# Patient Record
Sex: MALE | Race: ASIAN | NOT HISPANIC OR LATINO | URBAN - METROPOLITAN AREA
[De-identification: names, ages, dates, MRNs, and addresses within clinical notes are randomized per-mention and may not be internally consistent; named-entity substitution may affect disease eponyms.]

---

## 2017-01-12 ENCOUNTER — INPATIENT (INPATIENT)
Facility: HOSPITAL | Age: 47
LOS: 0 days | Discharge: HOME | End: 2017-01-13
Attending: INTERNAL MEDICINE | Admitting: INTERNAL MEDICINE
Payer: COMMERCIAL

## 2017-01-12 VITALS
DIASTOLIC BLOOD PRESSURE: 79 MMHG | RESPIRATION RATE: 18 BRPM | TEMPERATURE: 99 F | SYSTOLIC BLOOD PRESSURE: 118 MMHG | HEART RATE: 72 BPM | OXYGEN SATURATION: 99 %

## 2017-01-12 DIAGNOSIS — R07.9 CHEST PAIN, UNSPECIFIED: ICD-10-CM

## 2017-01-12 DIAGNOSIS — Z98.890 OTHER SPECIFIED POSTPROCEDURAL STATES: Chronic | ICD-10-CM

## 2017-01-12 LAB
ALBUMIN SERPL ELPH-MCNC: 4.6 G/DL — SIGNIFICANT CHANGE UP (ref 3.3–5)
ALP SERPL-CCNC: 50 U/L — SIGNIFICANT CHANGE UP (ref 40–120)
ALT FLD-CCNC: 32 U/L — SIGNIFICANT CHANGE UP (ref 4–41)
APTT BLD: 37.4 SEC — SIGNIFICANT CHANGE UP (ref 27.5–37.4)
AST SERPL-CCNC: 26 U/L — SIGNIFICANT CHANGE UP (ref 4–40)
BASOPHILS # BLD AUTO: 0.01 K/UL — SIGNIFICANT CHANGE UP (ref 0–0.2)
BASOPHILS NFR BLD AUTO: 0.1 % — SIGNIFICANT CHANGE UP (ref 0–2)
BILIRUB SERPL-MCNC: 0.4 MG/DL — SIGNIFICANT CHANGE UP (ref 0.2–1.2)
BUN SERPL-MCNC: 21 MG/DL — SIGNIFICANT CHANGE UP (ref 7–23)
CALCIUM SERPL-MCNC: 9.4 MG/DL — SIGNIFICANT CHANGE UP (ref 8.4–10.5)
CHLORIDE SERPL-SCNC: 100 MMOL/L — SIGNIFICANT CHANGE UP (ref 98–107)
CK MB BLD-MCNC: 1.59 NG/ML — SIGNIFICANT CHANGE UP (ref 1–6.6)
CK SERPL-CCNC: 148 U/L — SIGNIFICANT CHANGE UP (ref 30–200)
CO2 SERPL-SCNC: 24 MMOL/L — SIGNIFICANT CHANGE UP (ref 22–31)
CREAT SERPL-MCNC: 1 MG/DL — SIGNIFICANT CHANGE UP (ref 0.5–1.3)
EOSINOPHIL # BLD AUTO: 0.1 K/UL — SIGNIFICANT CHANGE UP (ref 0–0.5)
EOSINOPHIL NFR BLD AUTO: 1 % — SIGNIFICANT CHANGE UP (ref 0–6)
GLUCOSE SERPL-MCNC: 81 MG/DL — SIGNIFICANT CHANGE UP (ref 70–99)
HCT VFR BLD CALC: 44.2 % — SIGNIFICANT CHANGE UP (ref 39–50)
HGB BLD-MCNC: 14.9 G/DL — SIGNIFICANT CHANGE UP (ref 13–17)
IMM GRANULOCYTES NFR BLD AUTO: 0.3 % — SIGNIFICANT CHANGE UP (ref 0–1.5)
INR BLD: 1.13 — SIGNIFICANT CHANGE UP (ref 0.87–1.18)
LYMPHOCYTES # BLD AUTO: 3.18 K/UL — SIGNIFICANT CHANGE UP (ref 1–3.3)
LYMPHOCYTES # BLD AUTO: 31 % — SIGNIFICANT CHANGE UP (ref 13–44)
MCHC RBC-ENTMCNC: 30.4 PG — SIGNIFICANT CHANGE UP (ref 27–34)
MCHC RBC-ENTMCNC: 33.7 % — SIGNIFICANT CHANGE UP (ref 32–36)
MCV RBC AUTO: 90.2 FL — SIGNIFICANT CHANGE UP (ref 80–100)
MONOCYTES # BLD AUTO: 0.74 K/UL — SIGNIFICANT CHANGE UP (ref 0–0.9)
MONOCYTES NFR BLD AUTO: 7.2 % — SIGNIFICANT CHANGE UP (ref 2–14)
NEUTROPHILS # BLD AUTO: 6.2 K/UL — SIGNIFICANT CHANGE UP (ref 1.8–7.4)
NEUTROPHILS NFR BLD AUTO: 60.4 % — SIGNIFICANT CHANGE UP (ref 43–77)
PLATELET # BLD AUTO: 192 K/UL — SIGNIFICANT CHANGE UP (ref 150–400)
PMV BLD: 10.4 FL — SIGNIFICANT CHANGE UP (ref 7–13)
POTASSIUM SERPL-MCNC: 4 MMOL/L — SIGNIFICANT CHANGE UP (ref 3.5–5.3)
POTASSIUM SERPL-SCNC: 4 MMOL/L — SIGNIFICANT CHANGE UP (ref 3.5–5.3)
PROT SERPL-MCNC: 7.6 G/DL — SIGNIFICANT CHANGE UP (ref 6–8.3)
PROTHROM AB SERPL-ACNC: 12.9 SEC — SIGNIFICANT CHANGE UP (ref 10–13.1)
RBC # BLD: 4.9 M/UL — SIGNIFICANT CHANGE UP (ref 4.2–5.8)
RBC # FLD: 13 % — SIGNIFICANT CHANGE UP (ref 10.3–14.5)
SODIUM SERPL-SCNC: 139 MMOL/L — SIGNIFICANT CHANGE UP (ref 135–145)
TROPONIN T SERPL-MCNC: < 0.06 NG/ML — SIGNIFICANT CHANGE UP (ref 0–0.06)
WBC # BLD: 10.26 K/UL — SIGNIFICANT CHANGE UP (ref 3.8–10.5)
WBC # FLD AUTO: 10.26 K/UL — SIGNIFICANT CHANGE UP (ref 3.8–10.5)

## 2017-01-12 PROCEDURE — 71020: CPT | Mod: 26

## 2017-01-12 RX ORDER — ATORVASTATIN CALCIUM 80 MG/1
80 TABLET, FILM COATED ORAL AT BEDTIME
Qty: 0 | Refills: 0 | Status: DISCONTINUED | OUTPATIENT
Start: 2017-01-12 | End: 2017-01-13

## 2017-01-12 RX ORDER — ASPIRIN/CALCIUM CARB/MAGNESIUM 324 MG
81 TABLET ORAL DAILY
Qty: 0 | Refills: 0 | Status: DISCONTINUED | OUTPATIENT
Start: 2017-01-13 | End: 2017-01-13

## 2017-01-12 RX ORDER — CLOPIDOGREL BISULFATE 75 MG/1
75 TABLET, FILM COATED ORAL DAILY
Qty: 0 | Refills: 0 | Status: DISCONTINUED | OUTPATIENT
Start: 2017-01-13 | End: 2017-01-13

## 2017-01-12 RX ORDER — ASPIRIN/CALCIUM CARB/MAGNESIUM 324 MG
81 TABLET ORAL ONCE
Qty: 0 | Refills: 0 | Status: COMPLETED | OUTPATIENT
Start: 2017-01-12 | End: 2017-01-12

## 2017-01-12 RX ORDER — ROSUVASTATIN CALCIUM 5 MG/1
1 TABLET ORAL
Qty: 0 | Refills: 0 | COMMUNITY

## 2017-01-12 RX ORDER — SODIUM CHLORIDE 9 MG/ML
500 INJECTION INTRAMUSCULAR; INTRAVENOUS; SUBCUTANEOUS
Qty: 0 | Refills: 0 | Status: DISCONTINUED | OUTPATIENT
Start: 2017-01-12 | End: 2017-01-13

## 2017-01-12 RX ADMIN — ATORVASTATIN CALCIUM 80 MILLIGRAM(S): 80 TABLET, FILM COATED ORAL at 22:13

## 2017-01-12 RX ADMIN — Medication 81 MILLIGRAM(S): at 13:51

## 2017-01-12 NOTE — H&P ADULT. - ATTENDING COMMENTS
Pt. seen and examined.  In brief, 47 yo M with h/o CAD s/p JESSIE to LAD in Feb of 2016 admitted with chest pain.   -Admit to tele  -Symptoms concerning for unstable angina, will therefore perform LHC today  -Check TTE  -Further workup pending above

## 2017-01-12 NOTE — H&P CARDIOLOGY - FAMILY HISTORY
Mother  Still living? No  Family history of MI (myocardial infarction), Age at diagnosis: Age Unknown     Father  Still living? No  Family history of MI (myocardial infarction), Age at diagnosis: Age Unknown

## 2017-01-12 NOTE — ED PROVIDER NOTE - ATTENDING CONTRIBUTION TO CARE
46M h/o CAD s/p stents on ASA/plavix presents with CP.  First noted CP while shoveling snow 5d ago associated with lightheadedness and diaphoresis.  Improved with rest.  Since that time with persistent episodes of CP (none currently).  No SOB, no edema.  No cough or fever. On exam well appearing, nad, mmm, lungs clear, rrr, abd soft, no rash, no edema, 2+ pulses, no focal neuro deficits. EKG without acute ischemia, CXR clear.  Labs including trop negative, admit to cardiology.

## 2017-01-12 NOTE — DISCHARGE NOTE ADULT - ADDITIONAL INSTRUCTIONS
Monitor cardiac catheterization site for signs of bleeding, increased bruising, swelling, or discharge. If you experience any of these symptoms, please follow up with your primary care physician or return to the hospital immediately. Do not submerge the site in water (bathe or swim). You may shower. No strenuous activity for 3 weeks. Do not drive for 48hrs following angiogram.

## 2017-01-12 NOTE — ED ADULT NURSE NOTE - OBJECTIVE STATEMENT
45 y/o male presents to ED with c/o intermittent chest discomfort x 6 days.  Pt states that he started having chest tightness and numbness to both hands on Saturday while shoveling snow.  Pt states that the pain was relieved with rest.  Pt states that he has been having episodes of chest discomfort since.  Pt went to cardiologist yesterday with same complaint and was placed on holter monitor and advised to come to ED if pain returned.  Pt states that he had some discomfort c/w the pain that he felt when he needed cardiac stent in August of 2016.  Pt denies chest discomfort at this time.

## 2017-01-12 NOTE — DISCHARGE NOTE ADULT - CARE PROVIDER_API CALL
Desire Finn), Cardiovascular Disease; Internal Medicine  [address] [facility] [address] [place] [address]  Phone: [phone]  Fax: [phone]

## 2017-01-12 NOTE — DISCHARGE NOTE ADULT - CARE PLAN
Principal Discharge DX:	Chest pain  Goal:	s/p cath normal ocoronaries  Instructions for follow-up, activity and diet:	No heavy lifting x 1 week, no driving x 24 hours, no strenuous activity x 3 wks, monitor site of procedure and notify your doctor for any redness/ swelling/ discharge.  You may shower but no baths or swimming x 1 week   low salt &  low cholesterol

## 2017-01-12 NOTE — ED PROVIDER NOTE - OBJECTIVE STATEMENT
45 yo M with history of CAD s/p stents on DAPT, non smoker with strong family history presenting with chest pain constant x 5 days. Pt say Dr. Camarena yesterday and told to come to the ER if symptoms worsen and so patient arrives today with worsening symptoms. Symptoms started while shoveling snow and including shortness of breath and diaphoresis. Now patient only complains of constant chest pain. No recent travel, sick contacts or recent surgeries. Denies fevers, chills, cough, rhinorrhea, otorrhea, otalgia, nausea, vomiting, constipation, diarrhea, chest pain, shortness of breath or changes in urinary habits.

## 2017-01-12 NOTE — ED PROVIDER NOTE - MEDICAL DECISION MAKING DETAILS
45 yo M with history of chest pain, worsening x 4 days - followed by Dr. Camarena, ekg, labs, cxr - discussed with Adina MELCHOR)

## 2017-01-12 NOTE — DISCHARGE NOTE ADULT - HOSPITAL COURSE
46 year old male with family history of CAD with hyperlipidemia, known CAD with known stents who presented to [facility] ED 1/12/17 complaining of intermittent chest pain since shoveling snow this past Saturday with associated right arm numbness and tingling, SOB and diaphoresis. Admits to being constant x 5 days. Pt saw his Cardiologist, Dr. Camarena yesterday and told to come to the ER if symptoms worsen and so patient arrives today with worsening symptoms.   Patient R/O MI with negative troponin x1 and referred for cath. In light of patients known CAD history and symptoms there is high suspicion for CAD progression. The patient is now referred to [facility] for a cardiac catheterization with possible PTCA/stent.     On admission, Pt underwent cardiac cath which displayed EF 50%, LVEDP 13, patent prior stent LAD, pLAD 30, mLAD 30; LFA sheath removed.    ***incomplete 46 year old male with family history of CAD with hyperlipidemia, known CAD with known stents who presented to [facility] ED 1/12/17 complaining of intermittent chest pain since shoveling snow this past Saturday with associated right arm numbness and tingling, SOB and diaphoresis. Admits to being constant x 5 days. Pt saw his Cardiologist, Dr. Camarena yesterday and told to come to the ER if symptoms worsen and so patient arrives today with worsening symptoms.   Patient R/O MI with negative troponin x1 and referred for cath. In light of patients known CAD history and symptoms there is high suspicion for CAD progression. The patient is now referred to [facility] for a cardiac catheterization with possible PTCA/stent.     On admission, Pt underwent cardiac cath which displayed EF 50%, LVEDP 13, patent prior stent LAD, pLAD 30, mLAD 30; LFA sheath removed  1/13- Pt R groin site- No echymosis, no hematoma, DP pulse present.

## 2017-01-12 NOTE — DISCHARGE NOTE ADULT - PATIENT PORTAL LINK FT
“You can access the [facility] Patient Portal, offered by [facility], by registering with the following website: [email or web address]”

## 2017-01-12 NOTE — DISCHARGE NOTE ADULT - MEDICATION SUMMARY - MEDICATIONS TO TAKE
I will START or STAY ON the medications listed below when I get home from the hospital:    aspirin 81 mg oral delayed release tablet  -- 1 tab(s) by mouth once a day  -- Indication: For freduction of heart disease    Crestor 20 mg oral tablet  -- 1 tab(s) by mouth once a day (at bedtime)  -- Indication: For Cholesterol    clopidogrel 75 mg oral tablet  -- 1 tab(s) by mouth once a day  -- Indication: For Heart disease

## 2017-01-12 NOTE — DISCHARGE NOTE ADULT - PLAN OF CARE
s/p cath normal ocoronaries No heavy lifting x 1 week, no driving x 24 hours, no strenuous activity x 3 wks, monitor site of procedure and notify your doctor for any redness/ swelling/ discharge.  You may shower but no baths or swimming x 1 week   low salt &  low cholesterol

## 2017-01-12 NOTE — H&P CARDIOLOGY - HISTORY OF PRESENT ILLNESS
46 year old male with family history of CAD with hyperlipidemia, known CAD with known stents who presented to [facility] ED 1/12/17 complaining of intermittent chest pain since shoveling snow this past Saturday with associated right arm numbness and tingling, SOB and diaphoresis. Admits to being constant x 5 days. Pt saw his Cardiologist, Dr. Camarena yesterday and told to come to the ER if symptoms worsen and so patient arrives today with worsening symptoms.   Patient R/O MI with negative troponin x1 and referred for cath.   In light of patients known CAD history and symptoms there is high suspicion for CAD progression. The patient is now referred to [facility] for a cardiac catheterization with possible PTCA/stent.

## 2017-01-13 VITALS
RESPIRATION RATE: 18 BRPM | HEART RATE: 68 BPM | TEMPERATURE: 98 F | DIASTOLIC BLOOD PRESSURE: 60 MMHG | OXYGEN SATURATION: 100 % | SYSTOLIC BLOOD PRESSURE: 97 MMHG

## 2017-01-13 PROCEDURE — 71275 CT ANGIOGRAPHY CHEST: CPT | Mod: 26

## 2017-01-13 RX ADMIN — Medication 81 MILLIGRAM(S): at 11:36

## 2017-01-13 RX ADMIN — CLOPIDOGREL BISULFATE 75 MILLIGRAM(S): 75 TABLET, FILM COATED ORAL at 11:36

## 2017-11-22 NOTE — PATIENT PROFILE ADULT. - BILL OF RIGHTS/ADMISSION INFORMATION PROVIDED TO:
Body Location Override (Optional - Billing Will Still Be Based On Selected Body Map Location If Applicable): left lateral paraspinal Detail Level: Detailed Add 08515 Cpt? (Important Note: In 2017 The Use Of 03794 Is Being Tracked By Cms To Determine Future Global Period Reimbursement For Global Periods): yes Patient

## 2019-09-04 PROBLEM — I25.10 ATHEROSCLEROTIC HEART DISEASE OF NATIVE CORONARY ARTERY WITHOUT ANGINA PECTORIS: Chronic | Status: ACTIVE | Noted: 2017-01-12

## 2019-09-17 ENCOUNTER — APPOINTMENT (OUTPATIENT)
Dept: GASTROENTEROLOGY | Facility: CLINIC | Age: 49
End: 2019-09-17
Payer: COMMERCIAL

## 2019-09-17 VITALS
WEIGHT: 130 LBS | BODY MASS INDEX: 23.92 KG/M2 | DIASTOLIC BLOOD PRESSURE: 60 MMHG | SYSTOLIC BLOOD PRESSURE: 100 MMHG | TEMPERATURE: 97.8 F | HEIGHT: 62 IN | OXYGEN SATURATION: 98 % | HEART RATE: 68 BPM

## 2019-09-17 DIAGNOSIS — Z78.9 OTHER SPECIFIED HEALTH STATUS: ICD-10-CM

## 2019-09-17 DIAGNOSIS — Z82.49 FAMILY HISTORY OF ISCHEMIC HEART DISEASE AND OTHER DISEASES OF THE CIRCULATORY SYSTEM: ICD-10-CM

## 2019-09-17 DIAGNOSIS — Z86.39 PERSONAL HISTORY OF OTHER ENDOCRINE, NUTRITIONAL AND METABOLIC DISEASE: ICD-10-CM

## 2019-09-17 DIAGNOSIS — Z12.11 ENCOUNTER FOR SCREENING FOR MALIGNANT NEOPLASM OF COLON: ICD-10-CM

## 2019-09-17 PROBLEM — Z00.00 ENCOUNTER FOR PREVENTIVE HEALTH EXAMINATION: Status: ACTIVE | Noted: 2019-09-17

## 2019-09-17 PROCEDURE — 99244 OFF/OP CNSLTJ NEW/EST MOD 40: CPT

## 2019-09-17 RX ORDER — SODIUM PICOSULFATE, MAGNESIUM OXIDE, AND ANHYDROUS CITRIC ACID 10; 3.5; 12 MG/160ML; G/160ML; G/160ML
10-3.5-12 MG-GM LIQUID ORAL
Qty: 1 | Refills: 0 | Status: ACTIVE | COMMUNITY
Start: 2019-09-17 | End: 1900-01-01

## 2019-09-17 RX ORDER — ASPIRIN 81 MG
81 TABLET, DELAYED RELEASE (ENTERIC COATED) ORAL
Refills: 0 | Status: ACTIVE | COMMUNITY

## 2019-09-17 RX ORDER — ROSUVASTATIN CALCIUM 10 MG/1
10 TABLET, FILM COATED ORAL
Refills: 0 | Status: ACTIVE | COMMUNITY

## 2019-09-17 RX ORDER — METOPROLOL SUCCINATE 25 MG/1
25 TABLET, EXTENDED RELEASE ORAL
Refills: 0 | Status: ACTIVE | COMMUNITY

## 2019-09-17 NOTE — PHYSICAL EXAM
[Sclera] : the sclera and conjunctiva were normal [General Appearance - Alert] : alert [General Appearance - In No Acute Distress] : in no acute distress [PERRL With Normal Accommodation] : pupils were equal in size, round, and reactive to light [Extraocular Movements] : extraocular movements were intact [Outer Ear] : the ears and nose were normal in appearance [Oropharynx] : the oropharynx was normal [Neck Appearance] : the appearance of the neck was normal [Neck Cervical Mass (___cm)] : no neck mass was observed [Thyroid Nodule] : there were no palpable thyroid nodules [Jugular Venous Distention Increased] : there was no jugular-venous distention [Thyroid Diffuse Enlargement] : the thyroid was not enlarged [Auscultation Breath Sounds / Voice Sounds] : lungs were clear to auscultation bilaterally [Heart Rate And Rhythm] : heart rate was normal and rhythm regular [Heart Sounds] : normal S1 and S2 [Heart Sounds Gallop] : no gallops [Murmurs] : no murmurs [Heart Sounds Pericardial Friction Rub] : no pericardial rub [Normal] : normal [Soft, Nontender] : the abdomen was soft and nontender [Epigastric] : in the epigastric area [No Mass] : no masses were palpated [No HSM] : no hepatosplenomegaly noted [No CVA Tenderness] : no ~M costovertebral angle tenderness [No Spinal Tenderness] : no spinal tenderness [Nail Clubbing] : no clubbing  or cyanosis of the fingernails [Abnormal Walk] : normal gait [Musculoskeletal - Swelling] : no joint swelling seen [Skin Color & Pigmentation] : normal skin color and pigmentation [Motor Tone] : muscle strength and tone were normal [Skin Turgor] : normal skin turgor [] : no rash [Sensation] : the sensory exam was normal to light touch and pinprick [No Focal Deficits] : no focal deficits [Deep Tendon Reflexes (DTR)] : deep tendon reflexes were 2+ and symmetric

## 2019-09-17 NOTE — HISTORY OF PRESENT ILLNESS
[de-identified] : Initial colon \par \par Hx of stent - CV stable doing very well \par \par  The risks benefits alternatives and complications of the procedure/s were explained to the patient at length. The patient was agreeable and we will proceed.\par \par  A low FODMAP diet was discussed with the patient at length. The patient had multiple questions all of which were answered. I recommended a nutritionist. Also recommended that the patient keep a food diary. We discussed  options such as Vegetables. Fresh fruits. Dairy that is lactose-free, and hard cheeses, or ripened/matured cheeses including... Beef, pork, chicken, fish, eggs. Avoid breadcrumbs, marinades, and sauces/gravies that may be high in FODMAPs. Soy products including tofu, tempeh. Grains.\par \par

## 2019-11-05 ENCOUNTER — APPOINTMENT (OUTPATIENT)
Dept: GASTROENTEROLOGY | Facility: AMBULATORY MEDICAL SERVICES | Age: 49
End: 2019-11-05
Payer: COMMERCIAL

## 2019-11-05 PROCEDURE — 45378 DIAGNOSTIC COLONOSCOPY: CPT
